# Patient Record
Sex: FEMALE | Race: WHITE | URBAN - METROPOLITAN AREA
[De-identification: names, ages, dates, MRNs, and addresses within clinical notes are randomized per-mention and may not be internally consistent; named-entity substitution may affect disease eponyms.]

---

## 2022-06-21 ENCOUNTER — OFFICE VISIT (OUTPATIENT)
Dept: URBAN - METROPOLITAN AREA CLINIC 81 | Facility: CLINIC | Age: 67
End: 2022-06-21
Payer: COMMERCIAL

## 2022-06-21 DIAGNOSIS — S05.02XA INJURY OF CONJUNCTIVA W/O FB OF LT EYE, INITIAL ENCOUNTER: Primary | ICD-10-CM

## 2022-06-21 PROCEDURE — 99203 OFFICE O/P NEW LOW 30 MIN: CPT | Performed by: OPTOMETRIST

## 2022-06-21 NOTE — IMPRESSION/PLAN
Impression: Injury of conjunctiva w/o FB of lt eye, initial encounter: S05. [id number]. -scratched eye with bamboo
-no pain, vision blurrier than usual, injection 1+ nasal Plan: Discussed diagnosis with patient in detail. No abrasion found. Recommend preservative free artificial tears OS BID-QID, samples of Refresh Relieva, Systane and Refresh [place] dispensed. Reassured patient no AB drop needed at this time. Will continue to observe condition and/or symptoms. Patient instructed to call if condition gets worse.